# Patient Record
Sex: FEMALE | Race: WHITE
[De-identification: names, ages, dates, MRNs, and addresses within clinical notes are randomized per-mention and may not be internally consistent; named-entity substitution may affect disease eponyms.]

---

## 2019-01-07 ENCOUNTER — HOSPITAL ENCOUNTER (OUTPATIENT)
Dept: HOSPITAL 95 - MOI US | Age: 74
Discharge: HOME | End: 2019-01-07
Attending: FAMILY MEDICINE
Payer: MEDICARE

## 2019-01-07 DIAGNOSIS — C50.912: Primary | ICD-10-CM

## 2019-01-07 PROCEDURE — A4648 IMPLANTABLE TISSUE MARKER: HCPCS

## 2019-07-31 ENCOUNTER — HOSPITAL ENCOUNTER (EMERGENCY)
Dept: HOSPITAL 95 - ER | Age: 74
Discharge: HOME | End: 2019-07-31
Payer: MEDICARE

## 2019-07-31 VITALS — BODY MASS INDEX: 22.65 KG/M2 | HEIGHT: 57 IN | WEIGHT: 105.01 LBS

## 2019-07-31 DIAGNOSIS — E11.9: ICD-10-CM

## 2019-07-31 DIAGNOSIS — J45.901: Primary | ICD-10-CM

## 2019-07-31 DIAGNOSIS — Z87.891: ICD-10-CM

## 2019-07-31 DIAGNOSIS — Z79.52: ICD-10-CM

## 2019-07-31 DIAGNOSIS — Z79.82: ICD-10-CM

## 2019-07-31 DIAGNOSIS — Z79.899: ICD-10-CM

## 2019-07-31 LAB
ALBUMIN SERPL BCP-MCNC: 3.7 G/DL (ref 3.4–5)
ALBUMIN/GLOB SERPL: 1 {RATIO} (ref 0.8–1.8)
ALT SERPL W P-5'-P-CCNC: 15 U/L (ref 12–78)
ANION GAP SERPL CALCULATED.4IONS-SCNC: 12 MMOL/L (ref 6–16)
AST SERPL W P-5'-P-CCNC: 8 U/L (ref 12–37)
BASOPHILS # BLD AUTO: 0.01 K/MM3 (ref 0–0.23)
BASOPHILS NFR BLD AUTO: 0 % (ref 0–2)
BILIRUB SERPL-MCNC: 0.4 MG/DL (ref 0.1–1)
BUN SERPL-MCNC: 13 MG/DL (ref 8–24)
CALCIUM SERPL-MCNC: 9 MG/DL (ref 8.5–10.1)
CHLORIDE SERPL-SCNC: 96 MMOL/L (ref 98–108)
CO2 SERPL-SCNC: 26 MMOL/L (ref 21–32)
CREAT SERPL-MCNC: 0.79 MG/DL (ref 0.4–1)
DEPRECATED RDW RBC AUTO: 44.4 FL (ref 35.1–46.3)
EOSINOPHIL # BLD AUTO: 0 K/MM3 (ref 0–0.68)
EOSINOPHIL NFR BLD AUTO: 0 % (ref 0–6)
ERYTHROCYTE [DISTWIDTH] IN BLOOD BY AUTOMATED COUNT: 12.9 % (ref 11.7–14.2)
GLOBULIN SER CALC-MCNC: 3.6 G/DL (ref 2.2–4)
GLUCOSE SERPL-MCNC: 280 MG/DL (ref 70–99)
HCT VFR BLD AUTO: 33.5 % (ref 33–51)
HGB BLD-MCNC: 11.6 G/DL (ref 11.5–16)
IMM GRANULOCYTES # BLD AUTO: 0.07 K/MM3 (ref 0–0.1)
IMM GRANULOCYTES NFR BLD AUTO: 1 % (ref 0–1)
LYMPHOCYTES # BLD AUTO: 0.66 K/MM3 (ref 0.84–5.2)
LYMPHOCYTES NFR BLD AUTO: 6 % (ref 21–46)
MCHC RBC AUTO-ENTMCNC: 34.6 G/DL (ref 31.5–36.5)
MCV RBC AUTO: 94 FL (ref 80–100)
MONOCYTES # BLD AUTO: 0.57 K/MM3 (ref 0.16–1.47)
MONOCYTES NFR BLD AUTO: 6 % (ref 4–13)
NEUTROPHILS # BLD AUTO: 8.98 K/MM3 (ref 1.96–9.15)
NEUTROPHILS NFR BLD AUTO: 87 % (ref 41–73)
NRBC # BLD AUTO: 0 K/MM3 (ref 0–0.02)
NRBC BLD AUTO-RTO: 0 /100 WBC (ref 0–0.2)
PLATELET # BLD AUTO: 144 K/MM3 (ref 150–400)
POTASSIUM SERPL-SCNC: 3.4 MMOL/L (ref 3.5–5.5)
PROT SERPL-MCNC: 7.3 G/DL (ref 6.4–8.2)
SODIUM SERPL-SCNC: 134 MMOL/L (ref 136–145)
TROPONIN I SERPL-MCNC: <0.015 NG/ML (ref 0–0.04)

## 2019-10-28 ENCOUNTER — HOSPITAL ENCOUNTER (OUTPATIENT)
Dept: HOSPITAL 95 - ORSCSDS | Age: 74
Discharge: HOME | End: 2019-10-28
Attending: STUDENT IN AN ORGANIZED HEALTH CARE EDUCATION/TRAINING PROGRAM
Payer: MEDICARE

## 2019-10-28 VITALS — BODY MASS INDEX: 22.4 KG/M2 | WEIGHT: 106.7 LBS | HEIGHT: 57.99 IN

## 2019-10-28 DIAGNOSIS — K29.80: ICD-10-CM

## 2019-10-28 DIAGNOSIS — K22.2: ICD-10-CM

## 2019-10-28 DIAGNOSIS — E78.00: ICD-10-CM

## 2019-10-28 DIAGNOSIS — R10.9: ICD-10-CM

## 2019-10-28 DIAGNOSIS — E11.9: ICD-10-CM

## 2019-10-28 DIAGNOSIS — Z79.899: ICD-10-CM

## 2019-10-28 DIAGNOSIS — Z79.84: ICD-10-CM

## 2019-10-28 DIAGNOSIS — R12: Primary | ICD-10-CM

## 2019-10-28 DIAGNOSIS — K29.70: ICD-10-CM

## 2019-10-28 DIAGNOSIS — K44.9: ICD-10-CM

## 2019-10-28 DIAGNOSIS — R14.0: ICD-10-CM

## 2019-10-28 DIAGNOSIS — J45.909: ICD-10-CM

## 2019-10-28 PROCEDURE — 0DB68ZX EXCISION OF STOMACH, VIA NATURAL OR ARTIFICIAL OPENING ENDOSCOPIC, DIAGNOSTIC: ICD-10-PCS | Performed by: STUDENT IN AN ORGANIZED HEALTH CARE EDUCATION/TRAINING PROGRAM

## 2019-10-28 PROCEDURE — 0DB48ZX EXCISION OF ESOPHAGOGASTRIC JUNCTION, VIA NATURAL OR ARTIFICIAL OPENING ENDOSCOPIC, DIAGNOSTIC: ICD-10-PCS | Performed by: STUDENT IN AN ORGANIZED HEALTH CARE EDUCATION/TRAINING PROGRAM

## 2019-10-28 PROCEDURE — 0DB58ZX EXCISION OF ESOPHAGUS, VIA NATURAL OR ARTIFICIAL OPENING ENDOSCOPIC, DIAGNOSTIC: ICD-10-PCS | Performed by: STUDENT IN AN ORGANIZED HEALTH CARE EDUCATION/TRAINING PROGRAM

## 2019-10-28 PROCEDURE — 0DB98ZX EXCISION OF DUODENUM, VIA NATURAL OR ARTIFICIAL OPENING ENDOSCOPIC, DIAGNOSTIC: ICD-10-PCS | Performed by: STUDENT IN AN ORGANIZED HEALTH CARE EDUCATION/TRAINING PROGRAM

## 2019-10-28 NOTE — NUR
10/28/19 1447 Dianna Thomas
PRESCRIPTION FOR OMEPRAZOLE 20MG PO QD 30 MINS BEFORE BREAKFAST CALLED
[facility]TO [facility] PER PT REQUEST.

## 2019-12-09 NOTE — NUR
12/09/19 1657 Maria Luisa Thompson S
PT. WITH WHEEZES POST COLONOSCOPY, SATS 87-89%, PER DR. BRIAN OK TO
GIVEN DUONEB. PT. VERBALIZES HAVING A LITTLE BIT OF A SORE THROAT. PT.
DENIES SOB. PT. USES INHALERS & NEBULIZERS AT HOME AS NEEDED. PER DR. BRIAN'S SUGGESTION TO PT., PT. TO GO TO THE ER FOR CONTINUED CARE FOR
HER ABD. PAIN & TO HAVE A CT SCAN OF HER ABD. WITH CONTRAST. PT. WAS
SCHEDULED TO HAVE ONE DONE THIS COMING WED. BUT PER DR. BRIAN THIS WAY
IT CAN BE DONE EARLIER. PT. VERBALIZES WANTING TO GO TO THE ER.

## 2019-12-09 NOTE — NUR
12/09/19 1530 Kathleen Byrnes
PATIENT C/O ABDOMINAL PAIN AND RATES IT AT 10/10.  PATIENT ASKED RN TO
REQUEST PAIN MEDICATION FROM DR. LEIGH.  RN SPOKE WITH DR. LEIGH AND HE GAVE ORDERS FOR FENTANYL 25MCG IV.

## 2019-12-10 NOTE — NUR
12/10/19  2150   AMBULANCE HERE AND TOOK PT TO [facility] WITH DAUGHTER
ACCOMPANYING.  REPORT WAS GIVEN TO "HEAVEN DOWNING" ON THE ONCOLOGY FLOOR. PT HAS
ALL BELONGINGS.

## 2019-12-10 NOTE — NUR
SHIFT SUMMARY.
A&OX4, INDEPENDENT IN ROOM. PT WITH INTERMITTENT PAIN TO MID ABD THAT IS
MANAGED WELL WITH CURRENT ORDERS. PT REPORTS ABD TENDERNESS WITH PALPATATION.
NO N/V, POO PO INTAKE OF CLEAR LIQUIDS. ELEVATED CBG THIS AFTERNOON, DR. DE LA TORRE NOTIFIED AND RECIEVED ORDERS FOR LOW SS INSULIN WITH MEALS.
AWAITING COBRA TRANSFER, PT REQUESTS  RIDE WITH PT FOR TRANSPORT TO
[facility] CALLED AND THEY REPORTED THAT IT IS UP TO THE DRIVERS AND IS
A CASE BY CASE BASIS.

## 2019-12-10 NOTE — NUR
Spiritual Care inital note:
 
Tammy was alone in room and sleeping.  She awakend easily to voice.  she
responded well to prayer and spiritual . She is awaiting xfer to [facility].
Family will be going with her for support.   services will remain
available.

## 2019-12-10 NOTE — NUR
12/10/19  0350   SLEEPING WELL AFTER MEDICATED FOR PAIN EARLIER. VITALS
STABLE. IV FLUIDS RUNNING AT 75ML/HOUR. NO C/O NAUSEA SINCE BEING ADMITTED TO
MEDICAL FLOOR.

## 2019-12-10 NOTE — NUR
12/10/19   0545   SLEPT WELL THIS SHIFT. MEDICATED ONCE FOR PAIN AND
ENCOURAGED ORAL INTAKE OF CLEAR LIQUIDS.  POSSIBLE COBRA TRANSFER TODAY IF BED
AVAILABLE.

## 2020-01-03 NOTE — NUR
0910 DISCGARGE INSTRUCTIONS GIVEN TO PT AND . VERBALIZES UNDERSTANDING.
EVAN CDI. NO C/O PAIN OR DISCOMFORT.

## 2020-01-03 NOTE — NUR
Ambulatory in Day Surgery.  Surgical site prepped with 2% Chlorhexidine cloth
wipe.  History, Chart, Medications and Allergies reviewed before start of
procedure.Lungs clear T/O to Auscultation.  Patient confirms NPO status and
agrees with scheduled surgery.  Pre-Op teaching done. Pt verbalizes
understanding.  Patient States Post-Procedure ride home has been arranged.
Patient reports completing Chlorhexadine shower X2 prior to admission to
hospital.

## 2020-02-03 NOTE — NUR
HYPOTENSIVE
PT HYPOTENSIVE WITH BP 80'S OVER 30'S AT HS. PT ASYMPTOMATIC AND STATES SHE
FEELS NORMAL. NOTIFIED DR FLOYD REGARDING HYPOTENSION AND RECIEVED ORDER FOR
500 ML FLUID BOLUS. GAVE PT BOLUS AND RECHECKED BP WHICH WAS STILL 80'S OVER
30'S. PT STILL REPORTS FEELING NORMAL WITH NO SYMPTOMS. NOTIFIED DR FLOYD AGAIN
REGARDING HYPOTENSION AFTER BOLUS. PER DR FLOYD, CONTINUE TO MONITOR PT FOR NOW
AND NOTIFY HOSPITALIST IF SBP REACHES 70'S SINCE PT IS ASYMPTOMATIC AT THIS
TIME. WILL CONTINUE TO MONITOR.

## 2020-02-03 NOTE — NUR
SHIFT SUMMARY
PT ADMITTED THIS AFTERNOON AT 1515 FROM ER WITH UNIT #2 OF PRBC'S INFUSING. PT
REPORTED FEELING BETTER AFTER RECEIVING PRBC'S. VITALS HAVE REMAINED STABLE
THROUGH OUT SHIFT. TELEMETRY SHOWS PT TO BE IN A SINUS RHYTHM. PT C/O ABD PAIN
AND MEDICATED WITH PRN'S PER EMAR. THE PT IS ALERT AND ORIENTED, BUT FORGETFUL
AT TIMES. PT REPORTS LAST CHEMO ADMINISTRATION WAS 2 WEEKS AGO. PT HAS
MEDIPORT LOCATED ON LEFT CHEST, CURRENTLY NOT ACCESSED.

## 2020-02-04 NOTE — NUR
SHIFT SUMMARY
PATIENT OUT FOR UPPER ENDOSCOPY. SHE IS ABLE TO MAKE HER NEEDS KNOWN. FAMILY
AT BEDSIDE. HAS RECIEVED 1 UNIT OF BLOOD, WILL TRANSFUSE 2ND UNIT AFTER
RETURNING. BLOOD PRESSURE HAS IMPROVED THIS SHIFT. DR. SKAGGS OKAY TO HAVE
PAIN MEDICTIONS GIVEN TO KEEP PATIENT COMFORTABLE.

## 2020-02-04 NOTE — NUR
History, Chart, Medications and Allergies reviewed before start of
procedure.Lungs clear T/O to Auscultation,
DIMINISHED LLL. DENIES SOB. DR ROSE OK WITH USING MEDIPORT THAT WAS ALREADY
ACCESSED. RUNS GREAT.
Patient confirms NPO status and agrees with scheduled surgery.

## 2020-02-04 NOTE — NUR
PERMISSION
THIS PATIENT GAVE ME PERSMISSION TO BE INVOLVED IN HER CARE DURING AM
CLINICALS ON 2/5/20.

## 2020-02-04 NOTE — NUR
PATIENT COMPLAINING OF ABDOMINAL PAIN. BLOOD PRESSURE CONTINUES TO BE IN 90'S
SYSTOLICALLY. NOTIFIED PROVIDER OF LOW BP WITH INCREASED ABDOMINAL PAIN. DR. LANGLEYD TO GIVE ДМИТРИЙ, HOLD OXYCONTIN.  STATED HE WOULD EVALUATE HER AND HAVE
A CONVERSATION REGARDING GOALS.

## 2020-02-04 NOTE — NUR
Per admit trigger, I met with Mrs. Bruner to offer prayer and spiritual
support.  She is active in her Taoism bhargav and Father Yaya will be seing
to her spiritual needs.  Mrs. Bruner admitted to me she is overwhelmed with
this fairly new dx of pancreatic cancer.  She is happily  and appears
to have strong family support.  Her dtr was at bedside and appeared to be a
calm/loving presence.  Mrs. Bruner did not if her cancer treatment is
palliative or curative.  She admits chemo has been "much harder than I thought
it would be."  Both pt and dtr appeared to benefit from being heard and
affirmed.  Complimented them on their love for each other and encouraged
asking for clear, understandable explainations and POC.  I asured them of
excellent care and attention throughout hospitalization.  We had a good
rapport.  Pt will certainly benefit from continued /encouragement.

## 2020-02-04 NOTE — NUR
02/04/20 6368 Adelaida Hinkle
History, Chart, Medications and Allergies reviewed before start of
procedure.MAC CASE WITH DR. ROSE. SEE ANETHESIA RECORD FOR CARE

## 2020-02-04 NOTE — NUR
NIGHT SHIFT SUMMARY
PT AAOX4 AND PLEASANT. STANDBY ASSIST WITH AMBULATION. PT HYPOTENSIVE AT START
OF SHIFT WITH SBP IN THE 80'S. DR FLOYD NOTIFIED, SEE PREVIOUS NOTE FOR
DETAILS. BP INCREASED THROUGH THE NIGHT AND LAST  THIS AM. PT ONLY
COMPLAINT IS HER CHRONIC ABD PAIN R/T PANCREATIC CANCER. UNABLE TO GIVE PAIN
MEDS DUE TO ORDERED PARAMETERS R/T SBP. PT HAS SLEPT WELL THROUGH THE NIGHT
AND STATES SHE HAS BEEN COMFORTABLE MOST OF THE TIME AND THAT HER PAIN "COMES
AND GOES". NO BM'S THIS SHIFT. HGB 9.1 AT OO30 TONIGHT, DOWN JUST SLIGHTLY
FROM 9.6. WILL CONTINUE TO MONITOR.

## 2020-02-04 NOTE — NUR
PLACED ORDER FOR TRANSFUSE WITH HGB LESS THAN 8. HGB IS 8.8. DR. SKAGGS WANTS
TO PROCEED WITH TRANSFUSING 2 UNITS. NOTIFIED NADIA MCINTOSH OF INPUTTING ORDER.
NADIA STATED TO PLACE A NOTE.

## 2020-02-05 NOTE — NUR
NIGHT SHIFT SUMMARY
NO ACUTE CHANGES THIS SHIFT. PT AAOX4 AND PLEASANT. PAIN BETTER CONTROLLED
TONIGHT WITH PT GETTING SCHEDULED OXYCONTIN X1 AND ROXICODONE 20 MG X1 FOR
BREAKTHROUGH PAIN. THIS AM PT REPORTED HAVING NO PAIN AT ALL. NO BLOODY STOOLS
THIS SHIFT. PT HGB STABLE THROUGH THE NIGHT AT 10.4. SBP ALSO MAINTAINED >90
TONIGHT. VSS, WILL CONTINUE TO MONITOR.

## 2020-02-07 NOTE — NUR
PATIENT RAN TO HER BATHROOM AND VOMITED BILE-GREEN COLORED THROW-UP. SHE WAS
ABLE TO GATHER HERSELF TOGETHER AND LAID IN BED WITH A COOL WASH CLOTH TO HER
FOREHEAD. DENIES THE NEED FOR ANTINAUSEA MEDICATION AT THIS TIME. RECHECK HER
VITALS AND THEY ARE STABLE. WILL CONTINUE TO MONITOR AND PROVIDE CARE AS
NEEDED.

## 2020-02-07 NOTE — NUR
SHIFT SUMMARY
 
PT WAS NEW ER ADMIT THIS SHIFT FOR PAIN CONTROL, PT AND DAUGHTER ARRIVED TO
ROOM TOGETHER, BOTH WERE UPSET TO LEARN PT HAD NO ORDERS FOR DILAUDID & THAT
FENTANYL WAS ADMIN IN ER. THIS RN, CHARGE RN MARYAM, & JUAN M HERNANDES SPENT TIME AT
BEDSIDE AND SHILO VILLAFUERTE ALSO SPOKE WITH DAUGHTER (GAURI), NEW ORDERS
RECEIVED AND PT'S LAST PAIN REPORT WAS 2/10 @ 0024 AND APPEARS TO BE SLEEPING
COMFORTABLY AT THIS TIME, WILL CONT TO MONITOR UNTIL REPORT GIVEN TO DAY RN.

## 2020-02-07 NOTE — NUR
CALLED DR LEONG OFFICE TO OBTAIN PERMISSION TO ACCESS PATIENT'S CHEMO PORT AND
SHERI, WHO ANSWERED THE PHONE, STATED THAT IT WOULD BE FINE.

## 2020-02-07 NOTE — NUR
PT HAS MEDIPORT ACCESS. HAS BEEN REQUESTING IV PAIN MEDICATION EVERY FEW
HOURS. HOSPITALIST- ISAAC NOTIFIED AND KVO NORMAL SALINE 250 ML ORDERED TO KEEP
MEDIPORT OPEN INSTEAD OF HAVING TO HEPARIN LOCK IT EACH TIME. THE REASON TO
HAVE NORMAL SALINE RUNNING WAS EDUCATED TO PT AS WELL.

## 2020-02-07 NOTE — NUR
SHIFT SUMMARY
THE PATIENTS MAIN COMPLAINT TODAY HAS BEEN PAIN. SHE DID TRY LIQUID MSIR
HOWEVER VOMITED A BIT LATER AND FEELS IT'S RELATED TO THE MSIR SO DECLINES
TRYING THE MSIR AGAIN. PATIENT HAS TAKEN IV DILAUDID SEVERAL TIMES TODAY; HER
PAIN IS ALWAYS A 3-4 AND NEVER GETS LOWER THAN A 2-3. PATIENT NOW HAS HER
MEDIPORT ACCESSED WITH PERMISSION FROM DR LEONG. PATIENT IS ALERT AND
ORIENTED. INDEPENDENT. JUST CONTINUES TO HAVE CHRONIC, UNCONTROLLED PAIN. WILL
CONTINUE TO MONITOR AND PROVIDE CARE AS NEEDED.

## 2020-02-08 NOTE — NUR
NIGHT SHIFT SUMMARY
   PT A/OX4. PT SLEPT WELL TONIGHT AFTER I GAVE HER THE SECOND HALF OF HER
TRAZADONE. PT EDUCATED ON WEANING OFF OF DILAUDID BECAUSE SHE WOULD NOT BE
ABLE TO RECIEVE IV PAIN MEDICATION AT HOME. PT HESITANT ON TAKING PO ROXANOL.
PT REQUESTED TO HAVE DILAUDI 1 MG TWICE SO FAR ON MY SHIFT FOR ABOMINAL PAIN
DUE TO PANCREATIC CANCER. NS RUNNING AT 15ML/HR KVO FOR MEDIPORT.  NO OTHER
COMPLAINTS. POSSIBLE DISCHARGE TO HOME TODAY.

## 2020-02-08 NOTE — NUR
Initial palliative care consult:
 
Kiera is 74 year old with a history of stage IV pancreatic cancer, asthma,
hyperlipidemia, insomnia, IBS, left breast cancer, depression, DM, anemia and
Vitamin D deficiency.  She was recently discharged from the hospital and took
movantik.  Per chart notes it appears that she had an adverse reaction to the
movantik with uncontrollable abdominal pain and N/V which started about 1
hour after taking her first dose of this medication.
 
She reports that earlier today prior to her  arriving to the hospital
that she had spoken with her nurse and they had talked about pain management
and the hospice option.  She is interested in more information.  Her 
is at the bedside during our conversation.
 
Allowed them to tell this writer about their life, their 50+ year marriage,
the "terminal" disease that she now has and events leading up to her
readmission.  Her daughter, Sarah who lives in [place], joined this conversation via
speaker phone.  Kiera reports she is tired of being in pain and that the
pain has restricted her ability to get out of bed and "live."  Reza, her
, reports that Kiera normally lives life to the fullest and recently
her inability to get out of bed is very troublesome.  Sarah reports that her
mom was in such pain and is so afraid to become painful again that she states
"I think my mom has some PTSD over being so painful."
 
Kiera is followed by Dr. Murcia who manages her treatment for pancreatic
cancer.  She reports that she knows her disease is incurable and that she will
die from it.  She states she is 1 1/2 cycles into her chemotherapy which to
start was planned for 4-6 cycles depending on how she does.  She states that
Dr. Murcia explained that she would have approximately 6 months to live without
any treatment and possibly 12-18 months with treatment.  Sarah agrees that this
is what Dr. Murcia had stated.  Kiera is hopeful that her pain can be managed
so that she will feel well enough to continue treatments and have an improved
quality of life.  Kiera states her goals are to live to see her grandson get
 in May of this year and to maybe take a trip to [place] for a week if
she gets to feeling better.
 
Discussed pain management goals.  She currently rates her pain 2/10 and Dr. Acevedo made some adjustments to her pain medications this morning.  Her
fentanyl patch has been increased to 75 mcg Q3days (up form 25 mcg) and her
oxycontin dose has been increased to 40 mg TID.  (up from 30 mg TID).  She is
currently on bowel care and reports her LBM was on Thursday, two days ago.
Discussed that pain management has to be monitored closely as too much pain
medication in her system can have negative effects that outweigh the benefits
(hypotension, respiratory depression, constipation.)  Pt and  verbalize
understanding.  Encouraged pt to not let her pain level get too high before
she asks for pain medication.  She reports that she was waiting until she was
a 6-8/10 before starting to ask for pain medication.  Instructed her that
staff cannot promise to completely take away all of her pain, however staff
will work to manage her pain so that she is comfortable enough to be able to
get out of bed and have an improved quality of life. Explained that the
medication changes that Dr. Acevedo made today will take a day or so to
determine how effective they are.   Provided a pamphlet and briefly discussed
other nonpharmalogical pain reduction techniques.
 
Explained to Reza Qureshi and Sarah what palliative care means and answered
questions.  Explained that palliative care encompasses the patient and
family as a whole with multiple domains that are assessed.  Kiera
reports she has great bhargav and states that God is in charge and that it is
His timing for when she will pass away.  She feels very supported by her
family.  She reports that at times she is tearful and other times she is more
cheerful. Explained that everyone navigates feelings differently when faced
with a terminal diagnosis and that the feelings she has been experiencing of
tears and sadness mixed with laughter are normal.  Explained that there
are chaplains that she could talk to if she needed additional support.  Also
answered their questions about hospice as an option should she decide to not
continue treatments or if treatments become ineffective in the future.  They
were very appreciative of the information and stated that they did not realize
what services were provided by Hospice.
 
Other than her pain, she reports no nausea at this time.  She states she
wasn't able to eat much at home, however she reports she has been able to eat
her in the hospital.  Encouraged fluid intake for helping with constipation.
No other symptoms at this time.
 
Provided them with information on pain managment, recipes for helping
with constipation to take home, a hospice brochure, a considering comfort care
booklet for future reference and contact information for the palliative care
team.  Will plan to follow up with Kiera tomorrow to assess the
effectiveness of the medication changes and to evaluate other symptoms.
Kiera and her family thanked this writer for the information and stated it
made them feel hopeful that she could continue with treatments and have
her symptoms managed.

## 2020-02-09 NOTE — NUR
NIGHT SHIFT SUMMARY
 PT A/O X4. INDEPENDENT IN ROOM. SLEPT WELL TONIGHT. PAIN IS BETTER CONTROLLED
COMPARED TO LAST NIGHT. PT WAS MEDICATED WITH 1 OXYCODONE 40 MG TAB CONTROLLED
RELEASE AND RECIEVED 1 ROXICODONE PO 10 MG FOR BREAKTHROUGH PAIN. PT KNOWS TO
CALL BEFORE PAIN LEVEL GETS HIGH. PT'S PAIN LEVEL HAS BEEN BETWEEN 2-3/10

## 2020-02-09 NOTE — NUR
HAD LARGE B.M AFTER SUPPOSITORY. STS FEELS MUCH BETTER. REVIEW D'C W/PATIENT.
AWARE TO BRING HARD COPY RX TO PHARMACY WHEN SHE PICKS UP HER OTHER MEDS.
AWARE [facility] WILL CALL HER TO MAKE F/U APPT. REVIEW ALL MEDS AND AWARE TO
CHANGE FENTANYL PATCH TUES MORNING. AWARE CAN RETURN TO E.R. IF NEEDED. ANSWER
ALL QUESTIONS. AWAITING RIDE.